# Patient Record
Sex: MALE | URBAN - METROPOLITAN AREA
[De-identification: names, ages, dates, MRNs, and addresses within clinical notes are randomized per-mention and may not be internally consistent; named-entity substitution may affect disease eponyms.]

---

## 2023-07-17 ENCOUNTER — WEB ENCOUNTER (OUTPATIENT)
Dept: URBAN - METROPOLITAN AREA CLINIC 70 | Facility: CLINIC | Age: 58
End: 2023-07-17

## 2023-07-20 ENCOUNTER — OFFICE VISIT (OUTPATIENT)
Dept: URBAN - METROPOLITAN AREA CLINIC 70 | Facility: CLINIC | Age: 58
End: 2023-07-20
Payer: COMMERCIAL

## 2023-07-20 ENCOUNTER — DASHBOARD ENCOUNTERS (OUTPATIENT)
Age: 58
End: 2023-07-20

## 2023-07-20 ENCOUNTER — LAB OUTSIDE AN ENCOUNTER (OUTPATIENT)
Dept: URBAN - METROPOLITAN AREA CLINIC 70 | Facility: CLINIC | Age: 58
End: 2023-07-20

## 2023-07-20 VITALS
HEART RATE: 77 BPM | BODY MASS INDEX: 40 KG/M2 | WEIGHT: 279.4 LBS | HEIGHT: 70 IN | DIASTOLIC BLOOD PRESSURE: 88 MMHG | SYSTOLIC BLOOD PRESSURE: 156 MMHG

## 2023-07-20 DIAGNOSIS — Z12.11 COLON CANCER SCREENING: ICD-10-CM

## 2023-07-20 DIAGNOSIS — K21.9 GASTROESOPHAGEAL REFLUX DISEASE, UNSPECIFIED WHETHER ESOPHAGITIS PRESENT: ICD-10-CM

## 2023-07-20 DIAGNOSIS — R10.13 EPIGASTRIC PAIN: ICD-10-CM

## 2023-07-20 DIAGNOSIS — R05.9 COUGH, UNSPECIFIED TYPE: ICD-10-CM

## 2023-07-20 PROBLEM — 235595009: Status: ACTIVE | Noted: 2023-07-20

## 2023-07-20 PROCEDURE — 99204 OFFICE O/P NEW MOD 45 MIN: CPT | Performed by: NURSE PRACTITIONER

## 2023-07-20 RX ORDER — TESTOSTERONE CYPIONATE 200 MG/ML
1 ML INJECTION INTRAMUSCULAR
Status: ACTIVE | COMMUNITY

## 2023-07-20 RX ORDER — OMEPRAZOLE 20 MG/1
1 CAPSULE 30 MINUTES BEFORE MORNING MEAL CAPSULE, DELAYED RELEASE ORAL ONCE A DAY
Status: ACTIVE | COMMUNITY

## 2023-07-20 RX ORDER — SILDENAFIL CITRATE 100 MG/1
1 TABLET AS NEEDED TABLET, FILM COATED ORAL ONCE A DAY
Status: ACTIVE | COMMUNITY

## 2023-07-20 RX ORDER — SUCRALFATE 1 G/1
1 TABLET ON AN EMPTY STOMACH TABLET ORAL TWICE A DAY
Status: ACTIVE | COMMUNITY

## 2023-07-20 RX ORDER — TAMSULOSIN HYDROCHLORIDE 0.4 MG/1
1 CAPSULE CAPSULE ORAL ONCE A DAY
Status: ACTIVE | COMMUNITY

## 2023-07-20 RX ORDER — TRAMADOL HYDROCHLORIDE 50 MG/1
1 TABLET AS NEEDED TABLET, FILM COATED ORAL ONCE A DAY
Status: ON HOLD | COMMUNITY

## 2023-07-20 RX ORDER — OMEPRAZOLE 40 MG/1
1 CAPSULE CAPSULE, DELAYED RELEASE ORAL TWICE A DAY
Qty: 180 CAPSULE | Refills: 1

## 2023-07-20 RX ORDER — HYDROXYZINE HYDROCHLORIDE 25 MG/1
1 TABLET AT BEDTIME AS NEEDED TABLET, FILM COATED ORAL ONCE A DAY
Status: ACTIVE | COMMUNITY

## 2023-07-20 RX ORDER — ICOSAPENT ETHYL 1 G/1
2 CAPSULES WITH MEALS CAPSULE ORAL TWICE A DAY
Status: ON HOLD | COMMUNITY

## 2023-07-20 RX ORDER — AMLODIPINE BESYLATE 10 MG/1
1 TABLET TABLET ORAL ONCE A DAY
Status: ACTIVE | COMMUNITY

## 2023-07-20 RX ORDER — ZOLPIDEM TARTRATE 10 MG/1
1 TABLET AT BEDTIME AS NEEDED TABLET, FILM COATED ORAL ONCE A DAY
Status: ACTIVE | COMMUNITY

## 2023-07-20 RX ORDER — TRAZODONE HYDROCHLORIDE 50 MG/1
1 TABLET AT BEDTIME AS NEEDED TABLET ORAL ONCE A DAY
Status: ACTIVE | COMMUNITY

## 2023-07-20 RX ORDER — VALSARTAN AND HYDROCHLOROTHIAZIDE 160; 25 MG/1; MG/1
1 TABLET TABLET, FILM COATED ORAL ONCE A DAY
Status: ACTIVE | COMMUNITY

## 2023-07-20 RX ORDER — FENOFIBRATE 160 MG/1
1 CAPSULE WITH FOOD TABLET ORAL ONCE A DAY
Status: ON HOLD | COMMUNITY

## 2023-07-20 RX ORDER — UBIDECARENONE 100 MG
AS DIRECTED CAPSULE ORAL
Status: ON HOLD | COMMUNITY

## 2023-07-20 RX ORDER — DAPAGLIFLOZIN 10 MG/1
1 TABLET TABLET, FILM COATED ORAL ONCE A DAY
Status: ACTIVE | COMMUNITY

## 2023-07-20 NOTE — HPI-TODAY'S VISIT:
Patient is a 57 y/o male who presents today accompanied by his wife for evaluation of GERD/abdominal pain and colonoscopy. He reports having Covid in June and since that time he has had a chronic cough and intermittent epigastric pain that is worse if his stomach is empty. Admits to occasional heartburn. Was previously taking Ibuprofen daily for years but stopped taking 1 month ago. Had a recent abdominal and chest x-ray ordered by PCP that was unremarkable. On Omeprazole 20mg for a couple of weeks with no improvement. No prior EGD or colonoscopy. No Fhx of colon cancer. Denies CP, SOB, wt loss, N/V, dysphagia, constipation, diarrhrea, or signs of GI bleeding such as hematemesis, melena, or hematochezia.

## 2023-08-02 ENCOUNTER — TELEPHONE ENCOUNTER (OUTPATIENT)
Dept: URBAN - METROPOLITAN AREA CLINIC 70 | Facility: CLINIC | Age: 58
End: 2023-08-02

## 2023-08-03 ENCOUNTER — OFFICE VISIT (OUTPATIENT)
Dept: URBAN - METROPOLITAN AREA SURGERY CENTER 24 | Facility: SURGERY CENTER | Age: 58
End: 2023-08-03
Payer: COMMERCIAL

## 2023-08-03 DIAGNOSIS — K29.60 OTHER GASTRITIS WITHOUT BLEEDING: ICD-10-CM

## 2023-08-03 DIAGNOSIS — R12 HEARTBURN: ICD-10-CM

## 2023-08-03 PROCEDURE — 43239 EGD BIOPSY SINGLE/MULTIPLE: CPT | Performed by: INTERNAL MEDICINE

## 2023-08-03 PROCEDURE — G8907 PT DOC NO EVENTS ON DISCHARG: HCPCS | Performed by: INTERNAL MEDICINE

## 2023-08-03 RX ORDER — DAPAGLIFLOZIN 10 MG/1
1 TABLET TABLET, FILM COATED ORAL ONCE A DAY
Status: ACTIVE | COMMUNITY

## 2023-08-03 RX ORDER — TRAMADOL HYDROCHLORIDE 50 MG/1
1 TABLET AS NEEDED TABLET, FILM COATED ORAL ONCE A DAY
Status: ON HOLD | COMMUNITY

## 2023-08-03 RX ORDER — TRAZODONE HYDROCHLORIDE 50 MG/1
1 TABLET AT BEDTIME AS NEEDED TABLET ORAL ONCE A DAY
Status: ACTIVE | COMMUNITY

## 2023-08-03 RX ORDER — ZOLPIDEM TARTRATE 10 MG/1
1 TABLET AT BEDTIME AS NEEDED TABLET, FILM COATED ORAL ONCE A DAY
Status: ACTIVE | COMMUNITY

## 2023-08-03 RX ORDER — TAMSULOSIN HYDROCHLORIDE 0.4 MG/1
1 CAPSULE CAPSULE ORAL ONCE A DAY
Status: ACTIVE | COMMUNITY

## 2023-08-03 RX ORDER — TESTOSTERONE CYPIONATE 200 MG/ML
1 ML INJECTION INTRAMUSCULAR
Status: ACTIVE | COMMUNITY

## 2023-08-03 RX ORDER — UBIDECARENONE 100 MG
AS DIRECTED CAPSULE ORAL
Status: ON HOLD | COMMUNITY

## 2023-08-03 RX ORDER — SILDENAFIL CITRATE 100 MG/1
1 TABLET AS NEEDED TABLET, FILM COATED ORAL ONCE A DAY
Status: ACTIVE | COMMUNITY

## 2023-08-03 RX ORDER — VALSARTAN AND HYDROCHLOROTHIAZIDE 160; 25 MG/1; MG/1
1 TABLET TABLET, FILM COATED ORAL ONCE A DAY
Status: ACTIVE | COMMUNITY

## 2023-08-03 RX ORDER — FENOFIBRATE 160 MG/1
1 CAPSULE WITH FOOD TABLET ORAL ONCE A DAY
Status: ON HOLD | COMMUNITY

## 2023-08-03 RX ORDER — ICOSAPENT ETHYL 1 G/1
2 CAPSULES WITH MEALS CAPSULE ORAL TWICE A DAY
Status: ON HOLD | COMMUNITY

## 2023-08-03 RX ORDER — SUCRALFATE 1 G/1
1 TABLET ON AN EMPTY STOMACH TABLET ORAL TWICE A DAY
Status: ACTIVE | COMMUNITY

## 2023-08-03 RX ORDER — AMLODIPINE BESYLATE 10 MG/1
1 TABLET TABLET ORAL ONCE A DAY
Status: ACTIVE | COMMUNITY

## 2023-08-03 RX ORDER — HYDROXYZINE HYDROCHLORIDE 25 MG/1
1 TABLET AT BEDTIME AS NEEDED TABLET, FILM COATED ORAL ONCE A DAY
Status: ACTIVE | COMMUNITY

## 2023-08-03 RX ORDER — OMEPRAZOLE 40 MG/1
1 CAPSULE CAPSULE, DELAYED RELEASE ORAL TWICE A DAY
Qty: 180 CAPSULE | Refills: 1 | Status: ACTIVE | COMMUNITY

## 2023-08-17 ENCOUNTER — TELEPHONE ENCOUNTER (OUTPATIENT)
Dept: URBAN - METROPOLITAN AREA CLINIC 70 | Facility: CLINIC | Age: 58
End: 2023-08-17

## 2023-08-18 ENCOUNTER — OFFICE VISIT (OUTPATIENT)
Dept: URBAN - METROPOLITAN AREA SURGERY CENTER 24 | Facility: SURGERY CENTER | Age: 58
End: 2023-08-18

## 2023-09-15 ENCOUNTER — OFFICE VISIT (OUTPATIENT)
Dept: URBAN - METROPOLITAN AREA SURGERY CENTER 24 | Facility: SURGERY CENTER | Age: 58
End: 2023-09-15

## 2025-06-09 ENCOUNTER — OFFICE VISIT (OUTPATIENT)
Dept: URBAN - METROPOLITAN AREA CLINIC 70 | Facility: CLINIC | Age: 60
End: 2025-06-09
Payer: COMMERCIAL

## 2025-06-09 DIAGNOSIS — Z12.11 COLON CANCER SCREENING: ICD-10-CM

## 2025-06-09 DIAGNOSIS — K64.5 THROMBOSED HEMORRHOIDS: ICD-10-CM

## 2025-06-09 PROCEDURE — 99214 OFFICE O/P EST MOD 30 MIN: CPT | Performed by: NURSE PRACTITIONER

## 2025-06-09 RX ORDER — DAPAGLIFLOZIN 10 MG/1
1 TABLET TABLET, FILM COATED ORAL ONCE A DAY
Status: ACTIVE | COMMUNITY

## 2025-06-09 RX ORDER — TAMSULOSIN HYDROCHLORIDE 0.4 MG/1
1 CAPSULE CAPSULE ORAL ONCE A DAY
Status: ACTIVE | COMMUNITY

## 2025-06-09 RX ORDER — ICOSAPENT ETHYL 1 G/1
2 CAPSULES WITH MEALS CAPSULE ORAL TWICE A DAY
Status: DISCONTINUED | COMMUNITY

## 2025-06-09 RX ORDER — TRAMADOL HYDROCHLORIDE 50 MG/1
1 TABLET AS NEEDED TABLET, FILM COATED ORAL ONCE A DAY
Status: DISCONTINUED | COMMUNITY

## 2025-06-09 RX ORDER — SPIRONOLACTONE 25 MG/1
TABLET, COATED ORAL
Qty: 60 TABLET | Status: ACTIVE | COMMUNITY

## 2025-06-09 RX ORDER — BUPROPION HYDROCHLORIDE 300 MG/1
TABLET, EXTENDED RELEASE ORAL
Qty: 90 TABLET | Status: ACTIVE | COMMUNITY

## 2025-06-09 RX ORDER — SUCRALFATE 1 G/1
1 TABLET ON AN EMPTY STOMACH TABLET ORAL TWICE A DAY
Status: ACTIVE | COMMUNITY

## 2025-06-09 RX ORDER — TESTOSTERONE CYPIONATE 200 MG/ML
1 ML INJECTION INTRAMUSCULAR
Status: DISCONTINUED | COMMUNITY

## 2025-06-09 RX ORDER — UBIDECARENONE 100 MG
AS DIRECTED CAPSULE ORAL
Status: DISCONTINUED | COMMUNITY

## 2025-06-09 RX ORDER — OMEPRAZOLE 40 MG/1
1 CAPSULE CAPSULE, DELAYED RELEASE ORAL TWICE A DAY
Qty: 180 CAPSULE | Refills: 1 | Status: DISCONTINUED | COMMUNITY

## 2025-06-09 RX ORDER — AMLODIPINE BESYLATE 10 MG/1
1 TABLET TABLET ORAL ONCE A DAY
Status: DISCONTINUED | COMMUNITY

## 2025-06-09 RX ORDER — SACUBITRIL AND VALSARTAN 24; 26 MG/1; MG/1
TABLET, FILM COATED ORAL
Qty: 180 TABLET | Status: ACTIVE | COMMUNITY

## 2025-06-09 RX ORDER — CARVEDILOL 3.12 MG/1
TABLET, FILM COATED ORAL
Qty: 120 TABLET | Status: ACTIVE | COMMUNITY

## 2025-06-09 RX ORDER — SILDENAFIL CITRATE 100 MG/1
1 TABLET AS NEEDED TABLET, FILM COATED ORAL ONCE A DAY
Status: ACTIVE | COMMUNITY

## 2025-06-09 RX ORDER — FENOFIBRATE 160 MG/1
1 CAPSULE WITH FOOD TABLET ORAL ONCE A DAY
Status: DISCONTINUED | COMMUNITY

## 2025-06-09 RX ORDER — TRAZODONE HYDROCHLORIDE 50 MG/1
1 TABLET AT BEDTIME AS NEEDED TABLET ORAL ONCE A DAY
Status: DISCONTINUED | COMMUNITY

## 2025-06-09 RX ORDER — VALSARTAN AND HYDROCHLOROTHIAZIDE 160; 25 MG/1; MG/1
1 TABLET TABLET, FILM COATED ORAL ONCE A DAY
Status: DISCONTINUED | COMMUNITY

## 2025-06-09 RX ORDER — HYDROXYZINE HYDROCHLORIDE 25 MG/1
1 TABLET AT BEDTIME AS NEEDED TABLET, FILM COATED ORAL ONCE A DAY
Status: DISCONTINUED | COMMUNITY

## 2025-06-09 RX ORDER — ZOLPIDEM TARTRATE 10 MG/1
1 TABLET AT BEDTIME AS NEEDED TABLET, FILM COATED ORAL ONCE A DAY
Status: DISCONTINUED | COMMUNITY

## 2025-06-09 RX ORDER — TEMAZEPAM 30 MG/1
TAKE 1 CAPSULE BY MOUTH DAILY AT BEDTIME AS NEEDED CAPSULE ORAL
Qty: 15 EACH | Refills: 1 | Status: ACTIVE | COMMUNITY

## 2025-06-09 NOTE — HPI-TODAY'S VISIT:
OV 7/20/23: Patient is a 59 y/o male who presents today accompanied by his wife for evaluation of GERD/abdominal pain and colonoscopy. He reports having Covid in June and since that time he has had a chronic cough and intermittent epigastric pain that is worse if his stomach is empty. Admits to occasional heartburn. Was previously taking Ibuprofen daily for years but stopped taking 1 month ago. Had a recent abdominal and chest x-ray ordered by PCP that was unremarkable. On Omeprazole 20mg for a couple of weeks with no improvement. No prior EGD or colonoscopy. No Fhx of colon cancer. Denies CP, SOB, wt loss, N/V, dysphagia, constipation, diarrhrea, or signs of GI bleeding such as hematemesis, melena, or hematochezia. ------------------- Today 6/9/25: Patient presents today for hemorrhoids. Had EGD in 2023 with finding of irregular z-line/gastritis (bx neg Barrettts or H pylori). States with diet change UGI symptoms have resolved. No longer taking PPI. He did not have colonoscopy completed. Today he c/o doing heavy lifting/yard work a few weeks ago and then developed a painful "lump"/hemorrhoid. Pain and size are improving. Has used OTC cream w/o resolution. Denies abdominal pain, wt loss, constipation, diarrhea, or rectal bleediing.

## 2025-06-09 NOTE — PHYSICAL EXAM RECTAL:
normal tone, no external hemorrhoids, no masses palpable, no melena, no red blood, +Thrombosed external hemorrhoid (chaperone present during exam; FAITH Kennedy MA)